# Patient Record
Sex: FEMALE | Race: WHITE | URBAN - METROPOLITAN AREA
[De-identification: names, ages, dates, MRNs, and addresses within clinical notes are randomized per-mention and may not be internally consistent; named-entity substitution may affect disease eponyms.]

---

## 2017-03-06 ENCOUNTER — OFFICE VISIT (OUTPATIENT)
Dept: FAMILY MEDICINE CLINIC | Facility: CLINIC | Age: 37
End: 2017-03-06

## 2017-03-06 VITALS
HEIGHT: 67 IN | RESPIRATION RATE: 20 BRPM | HEART RATE: 73 BPM | SYSTOLIC BLOOD PRESSURE: 102 MMHG | OXYGEN SATURATION: 99 % | WEIGHT: 121 LBS | TEMPERATURE: 99 F | BODY MASS INDEX: 18.99 KG/M2 | DIASTOLIC BLOOD PRESSURE: 70 MMHG

## 2017-03-06 DIAGNOSIS — J00 NASOPHARYNGITIS ACUTE: Primary | ICD-10-CM

## 2017-03-06 LAB
CONTROL LINE PRESENT WITH A CLEAR BACKGROUND (YES/NO): YES YES/NO
STREP GRP A CUL-SCR: NEGATIVE

## 2017-03-06 PROCEDURE — 99213 OFFICE O/P EST LOW 20 MIN: CPT | Performed by: NURSE PRACTITIONER

## 2017-03-06 PROCEDURE — 87880 STREP A ASSAY W/OPTIC: CPT | Performed by: NURSE PRACTITIONER

## 2017-03-06 NOTE — PROGRESS NOTES
CHIEF COMPLAINT:   Patient presents with:  Sore Throat        HPI:   Moiz Beth is a 43year old female presents to clinic with complaint of sore throat. Patient has had for 3 days. Symptoms have been waxing and waning since onset.   Patient reports fol submandibular lymphadenopathy. No posterior cervical or occipital lymphadenopathy. No results found for this or any previous visit (from the past 24 hour(s)). ASSESSMENT AND PLAN:   Assessment: 1.  Pharyngitis: Rapid strep screen is negative     Pl

## 2017-03-06 NOTE — PATIENT INSTRUCTIONS
· Common colds are caused by viruses which are not eradicated with antibiotics  · Cold remedies are to relieve symptoms and prevent complications rather than cure infection  · Rest and increased oral fluid intake is advised  · Increase humidity of the air

## 2017-08-29 PROBLEM — F41.9 ANXIETY: Status: ACTIVE | Noted: 2017-08-29

## 2018-03-05 ENCOUNTER — OFFICE VISIT (OUTPATIENT)
Dept: FAMILY MEDICINE CLINIC | Facility: CLINIC | Age: 38
End: 2018-03-05

## 2018-03-05 VITALS
OXYGEN SATURATION: 97 % | BODY MASS INDEX: 18.83 KG/M2 | SYSTOLIC BLOOD PRESSURE: 90 MMHG | DIASTOLIC BLOOD PRESSURE: 60 MMHG | HEIGHT: 67 IN | WEIGHT: 120 LBS | TEMPERATURE: 98 F | HEART RATE: 92 BPM | RESPIRATION RATE: 20 BRPM

## 2018-03-05 DIAGNOSIS — J02.9 PHARYNGITIS, UNSPECIFIED ETIOLOGY: Primary | ICD-10-CM

## 2018-03-05 LAB
CONTROL LINE PRESENT WITH A CLEAR BACKGROUND (YES/NO): YES YES/NO
STREP GRP A CUL-SCR: NEGATIVE

## 2018-03-05 PROCEDURE — 99213 OFFICE O/P EST LOW 20 MIN: CPT | Performed by: NURSE PRACTITIONER

## 2018-03-05 PROCEDURE — 87880 STREP A ASSAY W/OPTIC: CPT | Performed by: NURSE PRACTITIONER

## 2018-03-05 PROCEDURE — 87081 CULTURE SCREEN ONLY: CPT | Performed by: NURSE PRACTITIONER

## 2018-03-05 NOTE — PROGRESS NOTES
CHIEF COMPLAINT:   Patient presents with:  Sore Throat        HPI:   Dejuan Prince is a 42year old female presents to clinic with complaint of sore throat. Patient has had 3 days. Symptoms have been worsening since onset.   Patient reports following assoc hepatosplenomegaly, or tenderness on direct palpation  EXTREMITIES: no cyanosis, clubbing or edema  LYMPH: No anterior cervical. No submandibular lymphadenopathy. No posterior cervical or occipital lymphadenopathy.       Recent Results (from the past 24 ho

## 2018-03-07 ENCOUNTER — TELEPHONE (OUTPATIENT)
Dept: FAMILY MEDICINE CLINIC | Facility: CLINIC | Age: 38
End: 2018-03-07

## (undated) NOTE — MR AVS SNAPSHOT
[facility] [address]  [address] [place] [address] Road  [phone]               Thank you for choosing us for your health care visit with [facility].   We are glad to serve you and happy to provide you with this summary of your Allergies as of Mar 06, 2017     Celebrex [Celecoxib]     Difficulty breathing                Today's Vital Signs     BP Pulse Temp Height Weight BMI    102/70 mmHg 73 98.6 °F (37 °C) (Oral) 67\" 121 lb 18.95 kg/m2         Current Medications      Notice